# Patient Record
Sex: FEMALE | Race: WHITE | URBAN - METROPOLITAN AREA
[De-identification: names, ages, dates, MRNs, and addresses within clinical notes are randomized per-mention and may not be internally consistent; named-entity substitution may affect disease eponyms.]

---

## 2017-05-31 ENCOUNTER — OFFICE VISIT (OUTPATIENT)
Dept: INTERNAL MEDICINE CLINIC | Age: 27
End: 2017-05-31

## 2017-05-31 VITALS
HEIGHT: 66 IN | HEART RATE: 55 BPM | BODY MASS INDEX: 21.71 KG/M2 | TEMPERATURE: 98 F | RESPIRATION RATE: 18 BRPM | DIASTOLIC BLOOD PRESSURE: 93 MMHG | SYSTOLIC BLOOD PRESSURE: 150 MMHG | OXYGEN SATURATION: 99 % | WEIGHT: 135.1 LBS

## 2017-05-31 DIAGNOSIS — Z30.09 ENCOUNTER FOR OTHER GENERAL COUNSELING OR ADVICE ON CONTRACEPTION: ICD-10-CM

## 2017-05-31 DIAGNOSIS — N92.0 MENORRHAGIA WITH REGULAR CYCLE: Primary | ICD-10-CM

## 2017-05-31 DIAGNOSIS — R03.0 ELEVATED BLOOD PRESSURE READING: ICD-10-CM

## 2017-05-31 RX ORDER — NORGESTIMATE AND ETHINYL ESTRADIOL 0.25-0.035
1 KIT ORAL DAILY
Qty: 3 DOSE PACK | Refills: 4 | Status: SHIPPED | OUTPATIENT
Start: 2017-05-31 | End: 2017-09-28 | Stop reason: SDUPTHER

## 2017-05-31 NOTE — PATIENT INSTRUCTIONS
Learning About Birth Control: Intrauterine Device (IUD)  What is an intrauterine device (IUD)? The intrauterine device (IUD) is used to prevent pregnancy. It's a small, plastic, T-shaped device. Your doctor places the IUD in your uterus. You have a choice between a hormonal IUD and a copper IUD. The hormonal IUD prevents pregnancy by damaging or killing sperm. It also releases a type of the hormone progestin. Progestin prevents pregnancy in these ways: It thickens the mucus in the cervix. This makes it hard for sperm to travel into the uterus. It also thins the lining of the uterus, which makes it harder for a fertilized egg to attach to the uterus. Progestin can sometimes stop the ovaries from releasing an egg each month (ovulation). Hormonal IUDs prevent pregnancy for 3 to 5 years, depending on which IUD is used. Once you have it, you don't have to do anything else to prevent pregnancy. The copper IUD is wrapped in copper wire. Copper IUDs prevent pregnancy by making the uterus and fallopian tubes produce a fluid that kills sperm. The copper IUD prevents pregnancy for 10 years. Once you have it, you don't have to do anything else to prevent pregnancy. A string tied to the end of the IUD hangs down through the opening of the uterus (called the cervix) into the vagina. You can check that the IUD is in place by feeling for the string. The IUD usually stays in the uterus until your doctor removes it. How well does it work? In the first year of use:  · When the hormonal IUD is used exactly as directed, fewer than 1 woman out of 100 has an unplanned pregnancy. · When the copper IUD is used exactly as directed, fewer than 1 woman out of 100 has an unplanned pregnancy. Be sure to tell your doctor about any health problems you have or medicines you take. He or she can help you choose the birth control method that is right for you. What are the advantages of an IUD?   · An IUD is one of the most effective methods of birth control. · It prevents pregnancy for 3 to 10 years, depending on the type. You don't have to worry about birth control during this time. · It's safe to use while breastfeeding. · IUDs don't contain estrogen. So you can use an IUD if you don't want to take estrogen or can't take estrogen because you have certain health problems or concerns. · An IUD is convenient. It is always providing birth control. You don't need to remember to take a pill or get a shot. You don't have to interrupt sex to protect against pregnancy. · A hormonal IUD may reduce heavy bleeding and cramping. What are the disadvantages of an IUD? · An IUD doesn't protect against sexually transmitted infections (STIs), such as herpes or HIV/AIDS. If you aren't sure if your sex partner might have an STI, use a condom to protect against disease. · A copper IUD may cause periods with more bleeding and cramping. · You have to see a doctor to have an IUD inserted and removed. · You have to check to see if the string is in place. Where can you learn more? Go to http://korin-dina.info/. Enter Y435 in the search box to learn more about \"Learning About Birth Control: Intrauterine Device (IUD). \"  Current as of: May 30, 2016  Content Version: 11.2  © 1629-9512 [employer]. Care instructions adapted under license by [employer] (which disclaims liability or warranty for this information). If you have questions about a medical condition or this instruction, always ask your healthcare professional. [place] [address] any warranty or liability for your use of this information. Elevated Blood Pressure: Care Instructions  Your Care Instructions    Blood pressure is a measure of how hard the blood pushes against the walls of your arteries. It's normal for blood pressure to go up and down throughout the day. But if it stays up over time, you have high blood pressure.   Two numbers tell you your blood pressure. The first number is the systolic pressure. It shows how hard the blood pushes when your heart is pumping. The second number is the diastolic pressure. It shows how hard the blood pushes between heartbeats, when your heart is relaxed and filling with blood. An ideal blood pressure in adults is less than 120/80 (say \"120 over 80\"). High blood pressure is 140/90 or higher. You have high blood pressure if your top number is 140 or higher or your bottom number is 90 or higher, or both. The main test for high blood pressure is simple, fast, and painless. To diagnose high blood pressure, your doctor will test your blood pressure at different times. After testing your blood pressure, your doctor may ask you to test it again when you are home. If you are diagnosed with high blood pressure, you can work with your doctor to make a long-term plan to manage it. Follow-up care is a key part of your treatment and safety. Be sure to make and go to all appointments, and call your doctor if you are having problems. It's also a good idea to know your test results and keep a list of the medicines you take. How can you care for yourself at home? · Do not smoke. Smoking increases your risk for heart attack and stroke. If you need help quitting, talk to your doctor about stop-smoking programs and medicines. These can increase your chances of quitting for good. · Stay at a healthy weight. · Try to limit how much sodium you eat to less than 2,300 milligrams (mg) a day. Your doctor may ask you to try to eat less than 1,500 mg a day. · Be physically active. Get at least 30 minutes of exercise on most days of the week. Walking is a good choice. You also may want to do other activities, such as running, swimming, cycling, or playing tennis or team sports. · Avoid or limit alcohol. Talk to your doctor about whether you can drink any alcohol.   · Eat plenty of fruits, vegetables, and low-fat dairy products. Eat less saturated and total fats. · Learn how to check your blood pressure at home. When should you call for help? Call your doctor now or seek immediate medical care if:  · Your blood pressure is much higher than normal (such as 180/110 or higher). · You think high blood pressure is causing symptoms such as:  ¨ Severe headache. ¨ Blurry vision. Watch closely for changes in your health, and be sure to contact your doctor if:  · You do not get better as expected. Where can you learn more? Go to http://korin-dina.info/. Enter B491 in the search box to learn more about \"Elevated Blood Pressure: Care Instructions. \"  Current as of: October 19, 2016  Content Version: 11.2  © 5814-0537 [employer]. Care instructions adapted under license by [employer] (which disclaims liability or warranty for this information). If you have questions about a medical condition or this instruction, always ask your healthcare professional. [place] [address] any warranty or liability for your use of this information.

## 2017-05-31 NOTE — PROGRESS NOTES
Chief Complaint   Patient presents with    Contraception     annual   1. Have you been to the ER, urgent care clinic since your last visit? Hospitalized since your last visit? No    2. Have you seen or consulted any other health care providers outside of the [address] [place] [address] since your last visit? Include any pap smears or colon screening.  No

## 2017-05-31 NOTE — PROGRESS NOTES
Ms. Maik Hinojosa is a 32y.o. year old female who had concerns including Contraception. HPI:  Chief Complaint   Patient presents with    Contraception     annual     No prior hx HTN, no CP or SOB. BP Readings from Last 3 Encounters:   05/31/17 (!) 150/93   12/16/15 125/81   09/03/15 131/74         Past Medical History:   Diagnosis Date    Depression 2012     Current Outpatient Prescriptions   Medication Sig Dispense    norgestimate-ethinyl estradiol ([address] [facility], ,) 0.25-35 mg-mcg tab Take 1 Tab by mouth daily. 3 Dose Pack    FLUoxetine (PROZAC) 10 mg tablet Take 2 Tabs by mouth daily. Start with 20 mg at night for 1-2 weeks, if no change, take 30 mg in daytime also. (Patient taking differently: Take 20 mg by mouth daily. Start with 20 mg, and 10mg in PM (3-4pm)) 60 Tab    FLUoxetine (PROZAC) 20 mg tablet Take 1 Tab by mouth daily. 30 Tab     No current facility-administered medications for this visit. Reviewed PmHx, RxHx, FmHx, SocHx, AllgHx and updated and dated in the chart. ROS: Negative except for BOLD  General: fever, chills, fatigue  Respiratory: cough, SOB, wheezing  Cardiovascular:  CP, palpitation, ZHU, edema   Gastrointestinal: N/V/D, bleeding  Genito-Urinary: dysuria, hematuria  Musculoskeletal: muscle weakness, pain, swelling    OBJECTIVE:   Visit Vitals    BP (!) 150/93 (BP 1 Location: Left arm, BP Patient Position: Sitting)    Pulse (!) 55    Temp 98 °F (36.7 °C) (Oral)    Resp 18    Ht 5' 6\" (1.676 m)    Wt 135 lb 1.6 oz (61.3 kg)    LMP 05/22/2017    SpO2 99%    BMI 21.81 kg/m2     GEN: The patient appears well, alert, oriented x 3, in no distress. ENT: bilateral TM and canal normal.  Neck supple. No adenopathy or thyromegaly. ENRICO. Lungs: clear bilaterally, good air entry, no wheezes, rhonchi or rales. Cardiovascular: regular rate and rhythm. S1 and S2 normal, no murmurs,  Abdomen: + BS, soft without tenderness, guarding, rebound, mass or organomegaly. Extremities: no edema, normal peripheral pulses. UPT    Assessment/ Plan:       ICD-10-CM ICD-9-CM    1. Menorrhagia with regular cycle N92.0 626.2 norgestimate-ethinyl estradiol (SPRINTEC, 28,) 0.25-35 mg-mcg tab      REFERRAL TO OBSTETRICS AND GYNECOLOGY   2. Elevated blood pressure reading R03.0 796.2    3. Encounter for other general counseling or advice on contraception Z30.09 V25.09 REFERRAL TO OBSTETRICS AND GYNECOLOGY       Repeat /80    I have discussed the diagnosis with the patient and the intended plan as seen in the above orders. The patient has received an after-visit summary and questions were answered concerning future plans. Medication Side Effects and Warnings were discussed with patient. Follow-up Disposition:  Return in about 2 weeks (around 6/14/2017) for nurse visit- Blood pressure.       Keily Garcia MD

## 2017-06-13 ENCOUNTER — OFFICE VISIT (OUTPATIENT)
Dept: INTERNAL MEDICINE CLINIC | Age: 27
End: 2017-06-13

## 2017-06-13 ENCOUNTER — CLINICAL SUPPORT (OUTPATIENT)
Dept: INTERNAL MEDICINE CLINIC | Age: 27
End: 2017-06-13

## 2017-06-13 VITALS
DIASTOLIC BLOOD PRESSURE: 84 MMHG | TEMPERATURE: 96.9 F | HEART RATE: 49 BPM | WEIGHT: 135 LBS | HEIGHT: 66 IN | BODY MASS INDEX: 21.69 KG/M2 | RESPIRATION RATE: 16 BRPM | SYSTOLIC BLOOD PRESSURE: 128 MMHG

## 2017-06-13 DIAGNOSIS — R03.0 ELEVATED BLOOD PRESSURE READING: Primary | ICD-10-CM

## 2017-06-13 DIAGNOSIS — T14.8XXA SKIN ABRASION: ICD-10-CM

## 2017-06-13 NOTE — PATIENT INSTRUCTIONS
Skin Tears: Care Instructions  Your Care Instructions  As we get older, our skin gets drier and more fragile. Sometimes this can cause the outer layers of skin to split and tear open. Skin tears are treated in different ways. In some cases, doctors use pieces of tape called Steri-Strips to pull the skin together and help it heal. Other times, it's best to leave the tear open and cover it with a special wound-care bandage. Skin tears are usually not serious. They usually heal in a few weeks. But how long you take to heal depends on your body and the type of tear you have. Sometimes the torn piece of skin is used to protect the wound while it heals. But that piece of skin does not heal. It may fall off on its own. Or the doctor may remove it. As your tear heals, it's important to keep it clean to help prevent infection. The doctor has checked you carefully, but problems can develop later. If you notice any problems or new symptoms, get medical treatment right away. Follow-up care is a key part of your treatment and safety. Be sure to make and go to all appointments, and call your doctor if you are having problems. It's also a good idea to know your test results and keep a list of the medicines you take. How can you care for yourself at home? · If you have pain, ask your doctor if you can take an over-the-counter pain medicine, such as acetaminophen (Tylenol), ibuprofen (Advil, Motrin), or naproxen (Aleve). Be safe with medicines. Read and follow all instructions on the label. · If you have a bandage, follow your doctor's instructions for changing it. · If you have Steri-Strips, leave them on until they fall off. · Follow your doctor's instructions about bathing. · Gently wash the skin tear with plain water 2 times a day. Do not rub the area. · Let the area air dry. Or you can pat it carefully with a soft towel. When should you call for help?   Call your doctor now or seek immediate medical care if:  · You have signs of infection, such as:  ¨ Increased pain, swelling, warmth, or redness around the tear. ¨ Red streaks leading from the tear. ¨ Pus draining from the tear. ¨ A fever. · The tear starts to bleed a lot. Small amounts of blood are normal.  Watch closely for changes in your health, and be sure to contact your doctor if:  · You do not get better as expected. Where can you learn more? Go to http://korin-dina.info/. Enter A339 in the search box to learn more about \"Skin Tears: Care Instructions. \"  Current as of: May 27, 2016  Content Version: 11.2  © 0611-4999 [employer]. Care instructions adapted under license by [employer] (which disclaims liability or warranty for this information). If you have questions about a medical condition or this instruction, always ask your healthcare professional. [address] any warranty or liability for your use of this information.

## 2017-06-13 NOTE — MR AVS SNAPSHOT
Visit Information Date & Time Provider Department Dept. Phone Encounter #  
 6/13/2017  8:45 AM Dania Holliday MD [facility] and [address] [place] [address] [phone] [id number] Follow-up Instructions Return if symptoms worsen or fail to improve. Follow-up and Disposition History Upcoming Health Maintenance Date Due  
 HPV AGE 9Y-34Y (2 of 3 - Female 3 Dose Series) 2/10/2016 INFLUENZA AGE 9 TO ADULT 8/1/2017 PAP AKA CERVICAL CYTOLOGY 9/3/2018 DTaP/Tdap/Td series (2 - Td) 5/31/2027 Allergies as of 6/13/2017  Review Complete On: 6/13/2017 By: Norman Mckinney LPN Severity Noted Reaction Type Reactions Sulfa (Sulfonamide Antibiotics)  05/28/2015    Hives Current Immunizations  Never Reviewed No immunizations on file. Not reviewed this visit You Were Diagnosed With   
  
 Codes Comments Elevated blood pressure reading    -  Primary ICD-10-CM: R03.0 ICD-9-CM: 796.2 Skin abrasion     ICD-10-CM: T14.8 ICD-9-CM: 919.0 Vitals BP Pulse Temp Resp Height(growth percentile) Weight(growth percentile) 128/84 (BP 1 Location: Left arm, BP Patient Position: Sitting) (!) 49 96.9 °F (36.1 °C) (Oral) 16 5' 6\" (1.676 m) 135 lb (61.2 kg) LMP BMI OB Status Smoking Status 05/22/2017 21.79 kg/m2 Having regular periods Never Smoker BMI and BSA Data Body Mass Index Body Surface Area 21.79 kg/m 2 1.69 m 2 Preferred Pharmacy Pharmacy Name Phone [id number]. [phone] Your Updated Medication List  
  
   
This list is accurate as of: 6/13/17  9:00 AM.  Always use your most recent med list.  
  
  
  
  
 * FLUoxetine 20 mg tablet Commonly known as:  PROzac Take 1 Tab by mouth daily. * FLUoxetine 10 mg tablet Commonly known as:  PROzac Take 2 Tabs by mouth daily.  Start with 20 mg at night for 1-2 weeks, if no change, take 30 mg in daytime also. norgestimate-ethinyl estradiol 0.25-35 mg-mcg Tab Commonly known as:  [facility] (28) Take 1 Tab by mouth daily. * Notice: This list has 2 medication(s) that are the same as other medications prescribed for you. Read the directions carefully, and ask your doctor or other care provider to review them with you. Follow-up Instructions Return if symptoms worsen or fail to improve. Patient Instructions Skin Tears: Care Instructions Your Care Instructions As we get older, our skin gets drier and more fragile. Sometimes this can cause the outer layers of skin to split and tear open. Skin tears are treated in different ways. In some cases, doctors use pieces of tape called Steri-Strips to pull the skin together and help it heal. Other times, it's best to leave the tear open and cover it with a special wound-care bandage. Skin tears are usually not serious. They usually heal in a few weeks. But how long you take to heal depends on your body and the type of tear you have. Sometimes the torn piece of skin is used to protect the wound while it heals. But that piece of skin does not heal. It may fall off on its own. Or the doctor may remove it. As your tear heals, it's important to keep it clean to help prevent infection. The doctor has checked you carefully, but problems can develop later. If you notice any problems or new symptoms, get medical treatment right away. Follow-up care is a key part of your treatment and safety. Be sure to make and go to all appointments, and call your doctor if you are having problems. It's also a good idea to know your test results and keep a list of the medicines you take. How can you care for yourself at home?  
· If you have pain, ask your doctor if you can take an over-the-counter pain medicine, such as acetaminophen (Tylenol), ibuprofen (Advil, Motrin), or naproxen (Aleve). Be safe with medicines. Read and follow all instructions on the label. · If you have a bandage, follow your doctor's instructions for changing it. · If you have Steri-Strips, leave them on until they fall off. · Follow your doctor's instructions about bathing. · Gently wash the skin tear with plain water 2 times a day. Do not rub the area. · Let the area air dry. Or you can pat it carefully with a soft towel. When should you call for help? Call your doctor now or seek immediate medical care if: 
· You have signs of infection, such as: 
¨ Increased pain, swelling, warmth, or redness around the tear. ¨ Red streaks leading from the tear. ¨ Pus draining from the tear. ¨ A fever. · The tear starts to bleed a lot. Small amounts of blood are normal. 
Watch closely for changes in your health, and be sure to contact your doctor if: 
· You do not get better as expected. Where can you learn more? Go to http://korin-dina.info/. Enter T475 in the search box to learn more about \"Skin Tears: Care Instructions. \" Current as of: May 27, 2016 Content Version: 11.2 © 0713-1965 [employer]. Care instructions adapted under license by [employer] (which disclaims liability or warranty for this information). If you have questions about a medical condition or this instruction, always ask your healthcare professional. [place] [address] any warranty or liability for your use of this information. Introducing [facility]! [employer] introduces [employer] patient portal. Now you can access parts of your medical record, email your doctor's office, and request medication refills online. 1. In your internet browser, go to [email or web address]. [employer] 2. Click on the First Time User? Click Here link in the Sign In box. You will see the New Member Sign Up page. 3. Enter your GlamBox Access Code exactly as it appears below. You will not need to use this code after youve completed the sign-up process. If you do not sign up before the expiration date, you must request a new code. · GlamBox Access Code: [email or web address] Expires: 8/29/2017  4:07 PM 
 
4. Enter the last four digits of your Social Security Number (xxxx) and Date of Birth (mm/dd/yyyy) as indicated and click Submit. You will be taken to the next sign-up page. 5. Create a GlamBox ID. This will be your GlamBox login ID and cannot be changed, so think of one that is secure and easy to remember. 6. Create a GlamBox password. You can change your password at any time. 7. Enter your Password Reset Question and Answer. This can be used at a later time if you forget your password. 8. Enter your e-mail address. You will receive e-mail notification when new information is available in [address]. 9. Click Sign Up. You can now view and download portions of your medical record. 10. Click the Download Summary menu link to download a portable copy of your medical information. If you have questions, please visit the Frequently Asked Questions section of the GlamBox website. Remember, GlamBox is NOT to be used for urgent needs. For medical emergencies, dial 911. Now available from your iPhone and Android! Please provide this summary of care documentation to your next provider. Your primary care clinician is listed as Dameon Lemus. If you have any questions after today's visit, please call [phone].

## 2017-06-13 NOTE — PROGRESS NOTES
Ms. Des Galvan is a 32y.o. year old female who had concerns including Blood Pressure Check and Foot Injury. HPI:  Chief Complaint   Patient presents with    Blood Pressure Check    Foot Injury     BP Readings from Last 3 Encounters:   06/13/17 128/84   05/31/17 (!) 150/93   12/16/15 125/81       Past Medical History:   Diagnosis Date    Depression 2012     Current Outpatient Prescriptions   Medication Sig Dispense    norgestimate-ethinyl estradiol ([facility], ,) 0.25-35 mg-mcg tab Take 1 Tab by mouth daily. 3 Dose Pack    FLUoxetine (PROZAC) 10 mg tablet Take 2 Tabs by mouth daily. Start with 20 mg at night for 1-2 weeks, if no change, take 30 mg in daytime also. (Patient taking differently: Take 20 mg by mouth daily. Start with 20 mg, and 10mg in PM (3-4pm)) 60 Tab    FLUoxetine (PROZAC) 20 mg tablet Take 1 Tab by mouth daily. 30 Tab     No current facility-administered medications for this visit. Reviewed PmHx, RxHx, FmHx, SocHx, AllgHx and updated and dated in the chart. ROS: Negative except for BOLD  General: fever, chills, fatigue  Respiratory: cough, SOB, wheezing  Cardiovascular:  CP, palpitation, ZHU, edema   Gastrointestinal: N/V/D, bleeding  Genito-Urinary: dysuria, hematuria  Musculoskeletal: muscle weakness, pain, swelling    OBJECTIVE:   Visit Vitals    /84 (BP 1 Location: Left arm, BP Patient Position: Sitting)    Pulse (!) 49    Temp 96.9 °F (36.1 °C) (Oral)    Resp 16    Ht 5' 6\" (1.676 m)    Wt 135 lb (61.2 kg)    LMP 05/22/2017    BMI 21.79 kg/m2     GEN: The patient appears well, alert, oriented x 3, in no distress. Extremities: no edema, normal peripheral pulses. Neurological: normal, gross sensory and motor in tact without focal findings. Foot: superficial abrasions on sole foot x 3 , slgith erythema, no warmth or tenderness. Ankle FROM no erythema. Assessment/ Plan:       ICD-10-CM ICD-9-CM    1.  Elevated blood pressure reading R03.0 796.2 2. Skin abrasion T14.8 919.0        Sore of foot, no signs of systemic infections    I have discussed the diagnosis with the patient and the intended plan as seen in the above orders. The patient has received an after-visit summary and questions were answered concerning future plans. Medication Side Effects and Warnings were discussed with patient. Follow-up Disposition:  Return if symptoms worsen or fail to improve.       Edgardo Ying MD

## 2017-09-27 DIAGNOSIS — N92.0 MENORRHAGIA WITH REGULAR CYCLE: ICD-10-CM

## 2017-09-28 ENCOUNTER — TELEPHONE (OUTPATIENT)
Dept: INTERNAL MEDICINE CLINIC | Age: 27
End: 2017-09-28

## 2017-09-28 RX ORDER — NORGESTIMATE AND ETHINYL ESTRADIOL 0.25-0.035
KIT ORAL
Refills: 0 | OUTPATIENT
Start: 2017-09-28

## 2017-09-28 RX ORDER — NORGESTIMATE AND ETHINYL ESTRADIOL 0.25-0.035
1 KIT ORAL DAILY
Qty: 3 DOSE PACK | Refills: 4 | Status: SHIPPED | OUTPATIENT
Start: 2017-09-28

## 2017-09-28 NOTE — TELEPHONE ENCOUNTER
Patient called requesting that her prescription be filled asap. States she requested already. Her number is [phone].